# Patient Record
Sex: MALE | URBAN - METROPOLITAN AREA
[De-identification: names, ages, dates, MRNs, and addresses within clinical notes are randomized per-mention and may not be internally consistent; named-entity substitution may affect disease eponyms.]

---

## 2021-01-22 ENCOUNTER — OFFICE VISIT (OUTPATIENT)
Dept: URBAN - METROPOLITAN AREA CLINIC 18 | Facility: CLINIC | Age: 51
End: 2021-01-22

## 2021-01-22 DIAGNOSIS — H52.13 MYOPIA, BILATERAL: Primary | ICD-10-CM

## 2021-01-22 DIAGNOSIS — Z01.00: ICD-10-CM

## 2021-01-22 PROCEDURE — 92004 COMPRE OPH EXAM NEW PT 1/>: CPT | Performed by: STUDENT IN AN ORGANIZED HEALTH CARE EDUCATION/TRAINING PROGRAM

## 2021-01-22 ASSESSMENT — INTRAOCULAR PRESSURE
OS: 18
OD: 16

## 2021-01-22 ASSESSMENT — VISUAL ACUITY
OD: 20/20
OS: 20/20

## 2021-01-22 NOTE — IMPRESSION/PLAN
Impression: Encounter for exam of eyes without abnormal findings: Z01.00. Plan: Discussed findings, ocular health unremarkable OU. Pt reports family hx of retinal detachment (mother) -- reviewed signs/symptoms and directed pt to contact clinic ASAP if experiencing any.

## 2021-01-22 NOTE — IMPRESSION/PLAN
Impression: Myopia, bilateral: H52.13. Plan: Discussed findings. New Rx finalized and given to patient. Discussed trying PAL/BF to help with computer/reading and adaptation period.